# Patient Record
Sex: MALE | Race: WHITE | URBAN - METROPOLITAN AREA
[De-identification: names, ages, dates, MRNs, and addresses within clinical notes are randomized per-mention and may not be internally consistent; named-entity substitution may affect disease eponyms.]

---

## 2017-12-08 ENCOUNTER — HOSPITAL ENCOUNTER (OUTPATIENT)
Dept: NUCLEAR MEDICINE | Age: 72
Discharge: HOME | End: 2017-12-08
Attending: INTERNAL MEDICINE | Admitting: INTERNAL MEDICINE

## 2017-12-08 DIAGNOSIS — C61 PROSTATE CANCER (HCC): ICD-10-CM

## 2017-12-08 DIAGNOSIS — R97.20 ELEVATED PSA: ICD-10-CM

## 2017-12-08 RX ORDER — TC 99M MEDRONATE 20 MG/10ML
25 INJECTION, POWDER, LYOPHILIZED, FOR SOLUTION INTRAVENOUS
Status: COMPLETED | OUTPATIENT
Start: 2017-12-08 | End: 2017-12-08

## 2017-12-08 RX ADMIN — TC 99M MEDRONATE 25 MILLICURIE: 20 INJECTION, POWDER, LYOPHILIZED, FOR SOLUTION INTRAVENOUS at 10:20

## 2018-02-02 ENCOUNTER — INITIAL CONSULT (OUTPATIENT)
Dept: PULMONOLOGY | Age: 73
End: 2018-02-02

## 2018-02-02 ENCOUNTER — HOSPITAL ENCOUNTER (OUTPATIENT)
Dept: GENERAL RADIOLOGY | Age: 73
Discharge: HOME | End: 2018-02-02
Attending: INTERNAL MEDICINE | Admitting: INTERNAL MEDICINE

## 2018-02-02 VITALS
RESPIRATION RATE: 20 BRPM | HEIGHT: 68 IN | WEIGHT: 206 LBS | SYSTOLIC BLOOD PRESSURE: 130 MMHG | HEART RATE: 83 BPM | OXYGEN SATURATION: 98 % | BODY MASS INDEX: 31.22 KG/M2 | DIASTOLIC BLOOD PRESSURE: 88 MMHG

## 2018-02-02 DIAGNOSIS — R94.8 ABNORMAL PET SCAN OF MEDIASTINUM: ICD-10-CM

## 2018-02-02 DIAGNOSIS — R59.0 MEDIASTINAL ADENOPATHY: Primary | ICD-10-CM

## 2018-02-02 DIAGNOSIS — C61 PROSTATE CANCER (HCC): ICD-10-CM

## 2018-02-02 DIAGNOSIS — Z87.891 FORMER SMOKER: ICD-10-CM

## 2018-02-02 LAB
DLCO %PRED: NORMAL
DLCO PRE: NORMAL
FEF 25-75%-POST: 3.25
FEF 25-75%-PRE: 3.2
FEV1-POST: 2.71
FEV1-PRE: 2.69
FEV1/FVC-POST: 83.5
FEV1/FVC-PRE: 80.8
FVC-POST: 3.25
FVC-PRE: 3.33
MEP: NORMAL
MIP: NORMAL
TLC %PRED: NORMAL
TLC PRE: NORMAL

## 2018-02-02 PROCEDURE — G8417 CALC BMI ABV UP PARAM F/U: HCPCS | Performed by: INTERNAL MEDICINE

## 2018-02-02 PROCEDURE — 1036F TOBACCO NON-USER: CPT | Performed by: INTERNAL MEDICINE

## 2018-02-02 PROCEDURE — 99204 OFFICE O/P NEW MOD 45 MIN: CPT | Performed by: INTERNAL MEDICINE

## 2018-02-02 PROCEDURE — 3017F COLORECTAL CA SCREEN DOC REV: CPT | Performed by: INTERNAL MEDICINE

## 2018-02-02 PROCEDURE — G8484 FLU IMMUNIZE NO ADMIN: HCPCS | Performed by: INTERNAL MEDICINE

## 2018-02-02 PROCEDURE — 4040F PNEUMOC VAC/ADMIN/RCVD: CPT | Performed by: INTERNAL MEDICINE

## 2018-02-02 PROCEDURE — G8427 DOCREV CUR MEDS BY ELIG CLIN: HCPCS | Performed by: INTERNAL MEDICINE

## 2018-02-02 PROCEDURE — 1123F ACP DISCUSS/DSCN MKR DOCD: CPT | Performed by: INTERNAL MEDICINE

## 2018-02-02 RX ORDER — OMEPRAZOLE 20 MG/1
20 CAPSULE, DELAYED RELEASE ORAL DAILY
COMMUNITY

## 2018-02-02 RX ORDER — ASPIRIN 81 MG/1
81 TABLET ORAL DAILY
COMMUNITY

## 2018-02-02 RX ORDER — BICALUTAMIDE 50 MG/1
50 TABLET, FILM COATED ORAL DAILY
COMMUNITY

## 2018-02-02 RX ORDER — LISINOPRIL 10 MG/1
10 TABLET ORAL DAILY
COMMUNITY

## 2018-02-02 RX ORDER — DICLOFENAC SODIUM 75 MG/1
75 TABLET, DELAYED RELEASE ORAL 2 TIMES DAILY
COMMUNITY

## 2018-02-02 ASSESSMENT — PULMONARY FUNCTION TESTS
FEV1_POST: 2.71
FEV1/FVC_POST: 83.5
FVC_POST: 3.25
FEV1_PRE: 2.69
FVC_PRE: 3.33
FEV1/FVC_PRE: 80.8

## 2018-02-02 NOTE — PROGRESS NOTES
Subjective:   CHIEF COMPLAINT / HPI: 65-year-old male referred here for abnormal PET CT of the mediastinum. Had recent PET/CT ordered by [facility], [facility] to evaluate prostate cancer. He was diagnosed with prostate cancer 2 years ago and underwent [facility] therapy followed by hormonal therapy. Recently has had increasing PSA. PET/CT demonstrated abnormal mediastinal adenopathy with uptake. No history of recent bronchitis or pneumonia. No specific exposure which would put him at increased risk for fungal illness. Was a prior pack-a-day smoker for 15 years but quit 30 years ago. Denies a history of chronic lung disease. Does have a son who is asthmatic. No family history of lung cancer. PET/CT showed no evidence of abnormal uptake in lung area bilaterally  Does note mild dyspnea with significant exertion. Past Medical History:  Past Medical History:   Diagnosis Date    Abnormal PET scan of mediastinum 2/2/2018    Former smoker 2/2/2018    Mediastinal adenopathy 2/2/2018    Prostate cancer ([address] 2/2/2018       Current Medications:    No current facility-administered medications for this visit. Allergies   Allergen Reactions    Percocet [Oxycodone-Acetaminophen] Nausea And Vomiting    Vicodin [Hydrocodone-Acetaminophen] Nausea And Vomiting       Social History:    Social History     Social History    Marital status:      Spouse name: N/A    Number of children: N/A    Years of education: N/A     Social History Main Topics    Smoking status: Former Smoker     Packs/day: 1.00     Years: 15.00     Types: Cigarettes     Quit date: 2/2/1988    Smokeless tobacco: Never Used    Alcohol use Yes    Drug use: Unknown    Sexual activity: Not Asked     Other Topics Concern    None     Social History Narrative    None       Family History:    History reviewed. No pertinent family history.       REVIEW OF SYSTEMS:    CONSTITUTIONAL:  negative for fevers, chills, diaphoresis, activity

## 2018-02-04 LAB
HISTOPLASMA ANTIGEN URINE INTERP: NOT DETECTED
HISTOPLASMA ANTIGEN URINE: NOT DETECTED

## 2018-02-08 LAB
ASPERGILLUS ANTIBODY ID: NORMAL
BLASTOMYCES ANTIBODY ID: NORMAL
COCCIDIOIDES ANTIBODY ID: NORMAL
HISTOPLASMA AB, ID: NORMAL

## 2018-03-08 ENCOUNTER — HOSPITAL ENCOUNTER (OUTPATIENT)
Dept: WOMENS IMAGING | Age: 73
Discharge: HOME | End: 2018-03-08
Attending: UROLOGY | Admitting: UROLOGY

## 2018-03-08 DIAGNOSIS — M81.8 IDIOPATHIC OSTEOPOROSIS: ICD-10-CM

## 2018-03-08 DIAGNOSIS — M81.8 OTHER OSTEOPOROSIS WITHOUT CURRENT PATHOLOGICAL FRACTURE: ICD-10-CM
